# Patient Record
Sex: MALE | Race: WHITE | URBAN - METROPOLITAN AREA
[De-identification: names, ages, dates, MRNs, and addresses within clinical notes are randomized per-mention and may not be internally consistent; named-entity substitution may affect disease eponyms.]

---

## 2017-01-18 ENCOUNTER — HOSPITAL ENCOUNTER (OUTPATIENT)
Dept: LAB | Age: 57
Discharge: HOME | End: 2017-01-18

## 2017-01-18 PROCEDURE — 99001 SPECIMEN HANDLING PT-LAB: CPT | Performed by: INTERNAL MEDICINE

## 2017-01-25 ENCOUNTER — OFFICE VISIT (OUTPATIENT)
Dept: FAMILY MEDICINE CLINIC | Age: 57
End: 2017-01-25

## 2017-01-25 VITALS
RESPIRATION RATE: 18 BRPM | DIASTOLIC BLOOD PRESSURE: 79 MMHG | BODY MASS INDEX: 36.13 KG/M2 | OXYGEN SATURATION: 93 % | HEIGHT: 73 IN | TEMPERATURE: 97.9 F | WEIGHT: 272.6 LBS | SYSTOLIC BLOOD PRESSURE: 122 MMHG | HEART RATE: 91 BPM

## 2017-01-25 DIAGNOSIS — Z00.00 ROUTINE GENERAL MEDICAL EXAMINATION AT A HEALTH CARE FACILITY: ICD-10-CM

## 2017-01-25 DIAGNOSIS — R94.4 DECREASED GFR: ICD-10-CM

## 2017-01-25 DIAGNOSIS — E78.5 HYPERLIPIDEMIA, UNSPECIFIED HYPERLIPIDEMIA TYPE: Primary | ICD-10-CM

## 2017-01-25 DIAGNOSIS — M06.4 POLYARTHROPATHY, INFLAMMATORY (HCC): ICD-10-CM

## 2017-01-25 LAB
BILIRUB UR QL STRIP: NEGATIVE
GLUCOSE UR-MCNC: NEGATIVE MG/DL
KETONES P FAST UR STRIP-MCNC: NEGATIVE MG/DL
PH UR STRIP: 6 [PH] (ref 4.6–8)
PROT UR QL STRIP: NEGATIVE MG/DL
SP GR UR STRIP: 1.02 (ref 1–1.03)
UA UROBILINOGEN AMB POC: NORMAL (ref 0.2–1)
URINALYSIS CLARITY POC: CLEAR
URINALYSIS COLOR POC: YELLOW
URINE BLOOD POC: NORMAL
URINE LEUKOCYTES POC: NEGATIVE
URINE NITRITES POC: NEGATIVE

## 2017-01-25 NOTE — PROGRESS NOTES
Subjective:     Fabiana Marshall is a 60 y.o. male presenting for annual exam and complete physical.    Patient Active Problem List   Diagnosis Code    Lumbago M54.5    Other and unspecified hyperlipidemia E78.5     Patient Active Problem List    Diagnosis Date Noted    Other and unspecified hyperlipidemia 06/11/2014    Lumbago 01/04/2012     Current Outpatient Prescriptions   Medication Sig Dispense Refill    lovastatin (MEVACOR) 20 mg tablet Take 1 Tab by mouth nightly. 90 Tab 3    terbinafine HCl (LAMISIL) 250 mg tablet Take 1 Tab by mouth daily. 90 Tab 0    cyclobenzaprine (FLEXERIL) 10 mg tablet Take 1 Tab by mouth three (3) times daily as needed for Muscle Spasm(s). 15 Tab 0    tadalafil (CIALIS) 20 mg tablet Take 20 mg by mouth as needed.  4 Tab 12     No Known Allergies  Past Medical History   Diagnosis Date    Hypercholesterolemia      Past Surgical History   Procedure Laterality Date    Hx heent       T&A    Hx orthopaedic       knee x 2     Family History   Problem Relation Age of Onset    Cancer Father      prostate     Social History   Substance Use Topics    Smoking status: Never Smoker    Smokeless tobacco: Never Used    Alcohol use 3.5 oz/week     7 Cans of beer per week        Lab Results  Component Value Date/Time   WBC 6.2 01/18/2017 08:16 AM   HGB 14.7 01/18/2017 08:16 AM   HCT 42.6 01/18/2017 08:16 AM   PLATELET 788 31/07/3782 08:16 AM   MCV 86 01/18/2017 08:16 AM       Lab Results  Component Value Date/Time   Glucose 95 01/18/2017 08:16 AM   LDL, calculated 99 01/18/2017 08:16 AM   LDL Cholesterol 185 06/14/2013 08:40 AM   Creatinine 1.49 01/18/2017 08:16 AM      Lab Results  Component Value Date/Time   Cholesterol, total 178 01/18/2017 08:16 AM   Cholesterol, Total 276 06/14/2013 08:40 AM   HDL Cholesterol 40 01/18/2017 08:16 AM   HDL Cholesterol 42 06/14/2013 08:40 AM   LDL, calculated 99 01/18/2017 08:16 AM   LDL Cholesterol 185 06/14/2013 08:40 AM   Triglyceride 193 01/18/2017 08:16 AM       Lab Results  Component Value Date/Time   ALT 22 01/18/2017 08:16 AM   AST 24 01/18/2017 08:16 AM   Alk. phosphatase 73 01/18/2017 08:16 AM   Bilirubin, total 0.4 01/18/2017 08:16 AM       Lab Results  Component Value Date/Time   GFR est AA 59 01/18/2017 08:16 AM   GFR est non-AA 51 01/18/2017 08:16 AM   Creatinine 1.49 01/18/2017 08:16 AM   BUN 20 01/18/2017 08:16 AM   Sodium 143 01/18/2017 08:16 AM   Potassium 4.5 01/18/2017 08:16 AM   Chloride 103 01/18/2017 08:16 AM   CO2 24 01/18/2017 08:16 AM      Lab Results  Component Value Date/Time   Prostate Specific Ag 1.2 01/18/2017 08:16 AM   Prostate Specific Ag 2.8 12/21/2015 09:22 AM   Prostate Specific Ag 1.9 12/15/2014 09:13 AM     Lab Results  Component Value Date/Time   TSH 2.670 01/18/2017 08:16 AM   T4, Free 0.94 01/18/2017 08:16 AM      Lab Results   Component Value Date/Time    Glucose 95 01/18/2017 08:16 AM      Reviewed labs  gfr slightly down     Review of Systems  A comprehensive review of systems was negative.     Objective:     Visit Vitals    /79    Pulse 91    Temp 97.9 °F (36.6 °C)    Resp 18    Ht 6' 1\" (1.854 m)    Wt 272 lb 9.6 oz (123.7 kg)    SpO2 93%    BMI 35.97 kg/m2     Physical exam:   General appearance - alert, well appearing, and in no distress, oriented to person, place, and time, overweight and well hydrated  Mental status - alert, oriented to person, place, and time, normal mood, behavior, speech, dress, motor activity, and thought processes  Eyes - pupils equal and reactive, extraocular eye movements intact, sclera anicteric  Mouth - mucous membranes moist, pharynx normal without lesions and tongue normal  Neck - supple, no significant adenopathy, carotids upstroke normal bilaterally, no bruits  Lymphatics - no palpable lymphadenopathy, no hepatosplenomegaly  Chest - clear to auscultation, no wheezes, rales or rhonchi, symmetric air entry  Heart - normal rate, regular rhythm, normal S1, S2, no murmurs, rubs, clicks or gallops  Abdomen - soft, nontender, nondistended, no masses or organomegaly  bowel sounds normal  no bladder distension noted  no abdominal bruits  no pulsatile masses  no CVA tenderness  no inguinal adenopathy  no hernias noted   Male - no penile lesions or discharge, no testicular masses or tenderness, no hernias, PROSTATE EXAM: smooth and symmetric without nodules or tenderness, enlarged 2+, TESTICULAR EXAM: normal, no masses, PENIS: normal without lesions or discharge, circumcised, SCROTUM: normal, no masses  Rectal - negative without mass, lesions or tenderness  Back exam - full range of motion, no tenderness, palpable spasm or pain on motion, normal reflexes and strength bilateral lower extremities, sensory exam intact bilateral lower extremities  Neurological - alert, oriented, normal speech, no focal findings or movement disorder noted, motor and sensory grossly normal bilaterally, normal muscle tone, no tremors, strength 5/5  Musculoskeletal - no joint tenderness, deformity or swelling, no muscular tenderness noted, full range of motion without pain  Extremities - peripheral pulses normal, no pedal edema, no clubbing or cyanosis, no pedal edema noted, intact peripheral pulses  Skin - normal coloration and turgor, no rashes, no suspicious skin lesions noted   UA-  Assessment/Plan:     Routine exam  continue present plan, recheck BMP in 3 months. current treatment plan is effective, no change in therapy.

## 2017-01-25 NOTE — PROGRESS NOTES
Briscoe Babinski is a 60 y.o. male here today for annual physical.          1. Have you been to the ER, urgent care clinic since your last visit? Hospitalized since your last visit? No    2. Have you seen or consulted any other health care providers outside of the [facility] [place] since your last visit? Include any pap smears or colon screening.  No

## 2017-01-25 NOTE — MR AVS SNAPSHOT
Visit Information Date & Time Provider Department Dept. Phone Encounter #  
 1/25/2017 10:30 AM Cheli Marie, Brenda Mendes [phone] [id number] Follow-up Instructions Return in about 1 year (around 1/25/2018). Upcoming Health Maintenance Date Due COLONOSCOPY 4/18/1976 INFLUENZA AGE 9 TO ADULT 8/1/2016 DTaP/Tdap/Td series (2 - Td) 12/18/2023 Allergies as of 1/25/2017  Review Complete On: 1/25/2017 By: Cheli Marie MD  
 No Known Allergies Current Immunizations  Reviewed on 12/21/2015 Name Date Influenza Vaccine 12/21/2015  9:32 AM, 12/18/2013, 12/12/2012 Influenza Vaccine Daimiguelina Garciack) 12/17/2014 Tdap 12/18/2013 Not reviewed this visit You Were Diagnosed With   
  
 Codes Comments Hyperlipidemia, unspecified hyperlipidemia type    -  Primary ICD-10-CM: E78.5 ICD-9-CM: 272.4 Routine general medical examination at a health care facility     ICD-10-CM: Z00.00 ICD-9-CM: V70.0 Decreased GFR     ICD-10-CM: R94.4 ICD-9-CM: 794.4 Vitals BP Pulse Temp Resp Height(growth percentile) Weight(growth percentile) 122/79 91 97.9 °F (36.6 °C) 18 6' 1\" (1.854 m) 272 lb 9.6 oz (123.7 kg) SpO2 BMI Smoking Status 93% 35.97 kg/m2 Never Smoker Vitals History BMI and BSA Data Body Mass Index Body Surface Area  
 35.97 kg/m 2 2.52 m 2 Preferred Pharmacy Pharmacy Name Phone [id number] [address] [address] [phone] Your Updated Medication List  
  
   
This list is accurate as of: 1/25/17 11:03 AM.  Always use your most recent med list.  
  
  
  
  
 cyclobenzaprine 10 mg tablet Commonly known as:  FLEXERIL Take 1 Tab by mouth three (3) times daily as needed for Muscle Spasm(s). lovastatin 20 mg tablet Commonly known as:  MEVACOR Take 1 Tab by mouth nightly. tadalafil 20 mg tablet Commonly known as:  CIALIS Take 20 mg by mouth as needed. terbinafine HCl 250 mg tablet Commonly known as:  LAMISIL Take 1 Tab by mouth daily. We Performed the Following AMB POC URINALYSIS DIP STICK AUTO W/O MICRO [29253 CPT(R)] Follow-up Instructions Return in about 1 year (around 1/25/2018). To-Do List   
 01/25/2017 Lab:  METABOLIC PANEL, BASIC Introducing [facility]! Dear Rajeev Bruce: 
Thank you for requesting a [employer] account. Our records indicate that you already have an active EarlyDoc account. You can access your account anytime at [email or web address] Did you know that you can access your hospital and ER discharge instructions at any time in EarlyDoc? You can also review all of your test results from your hospital stay or ER visit. Additional Information If you have questions, please visit the Frequently Asked Questions section of the EarlyDoc website at [email or web address] Remember, EarlyDoc is NOT to be used for urgent needs. For medical emergencies, dial 911. Now available from your iPhone and Android! Please provide this summary of care documentation to your next provider. Your primary care clinician is listed as Charbel Posadas. If you have any questions after today's visit, please call [phone].

## 2017-07-20 ENCOUNTER — TELEPHONE (OUTPATIENT)
Dept: FAMILY MEDICINE CLINIC | Age: 57
End: 2017-07-20

## 2017-07-20 NOTE — TELEPHONE ENCOUNTER
Can you get more specifics: what joints, osteo vs rheumatoid, any x-rays? Find out if Dr. Estella Brunson takes their insurance.

## 2017-07-20 NOTE — TELEPHONE ENCOUNTER
Pt's wife called requesting a referral for him for arthritis. They do not have anybody in mind whatever DR Martina Mendoza recommends would be find. They have [employer], please advise.

## 2017-07-25 NOTE — TELEPHONE ENCOUNTER
Patient states its wrists, knuckles, knees, elbow. Patient states his arthritis has not been diagnosed by anyone so he doesn't know if its osteo or rheumatoid.   Still wants the referral.

## 2017-12-19 RX ORDER — LOVASTATIN 20 MG/1
TABLET ORAL
Qty: 90 TAB | Refills: 3 | Status: SHIPPED | OUTPATIENT
Start: 2017-12-19

## 2018-02-14 ENCOUNTER — TELEPHONE (OUTPATIENT)
Dept: FAMILY MEDICINE CLINIC | Age: 58
End: 2018-02-14

## 2018-02-14 DIAGNOSIS — Z00.00 ROUTINE GENERAL MEDICAL EXAMINATION AT A HEALTH CARE FACILITY: ICD-10-CM

## 2018-02-14 DIAGNOSIS — Z00.00 ROUTINE GENERAL MEDICAL EXAMINATION AT A HEALTH CARE FACILITY: Primary | ICD-10-CM

## 2018-02-19 LAB
A-G RATIO,AGRAT: 1.9 RATIO (ref 1.1–2.6)
ABSOLUTE LYMPHOCYTE COUNT, 10803: 2.1 K/UL (ref 1–4.8)
ALBUMIN SERPL-MCNC: 4.5 G/DL (ref 3.5–5)
ALP SERPL-CCNC: 84 U/L (ref 25–115)
ALT SERPL-CCNC: 26 U/L (ref 5–40)
ANION GAP SERPL CALC-SCNC: 16 MMOL/L
AST SERPL W P-5'-P-CCNC: 25 U/L (ref 10–37)
BASOPHILS # BLD: 0 K/UL (ref 0–0.2)
BASOPHILS NFR BLD: 1 % (ref 0–2)
BILIRUB SERPL-MCNC: 0.5 MG/DL (ref 0.2–1.2)
BUN SERPL-MCNC: 18 MG/DL (ref 6–22)
CALCIUM SERPL-MCNC: 9.4 MG/DL (ref 8.4–10.4)
CHLORIDE SERPL-SCNC: 103 MMOL/L (ref 98–110)
CHOLEST SERPL-MCNC: 171 MG/DL (ref 110–200)
CO2 SERPL-SCNC: 24 MMOL/L (ref 20–32)
CREAT SERPL-MCNC: 1.2 MG/DL (ref 0.5–1.2)
EOSINOPHIL # BLD: 0.2 K/UL (ref 0–0.5)
EOSINOPHIL NFR BLD: 4 % (ref 0–6)
ERYTHROCYTE [DISTWIDTH] IN BLOOD BY AUTOMATED COUNT: 14.4 % (ref 10–16)
GFRAA, 66117: >60
GFRNA, 66118: 59.7
GLOBULIN,GLOB: 2.4 G/DL (ref 2–4)
GLUCOSE SERPL-MCNC: 98 MG/DL (ref 70–99)
GRANULOCYTES,GRANS: 50 % (ref 40–75)
HCT VFR BLD AUTO: 43.4 % (ref 39.3–51.6)
HDLC SERPL-MCNC: 36 MG/DL (ref 40–59)
HDLC SERPL-MCNC: 4.8 MG/DL (ref 0–5)
HGB BLD-MCNC: 14.3 G/DL (ref 13.1–17.2)
LYMPHOCYTES, LYMLT: 35 % (ref 27–45)
MCH RBC QN AUTO: 29 PG (ref 26–34)
MCHC RBC AUTO-ENTMCNC: 33 G/DL (ref 32–36)
MCV RBC AUTO: 88 FL (ref 80–95)
MONOCYTES # BLD: 0.6 K/UL (ref 0.1–0.9)
MONOCYTES NFR BLD: 11 % (ref 3–9)
NEUTROPHILS # BLD AUTO: 3 K/UL (ref 1.8–7.7)
PLATELET # BLD AUTO: 272 K/UL (ref 140–440)
PMV BLD AUTO: 10.1 FL (ref 6–10.8)
POTASSIUM SERPL-SCNC: 4.6 MMOL/L (ref 3.5–5.5)
PROT SERPL-MCNC: 6.9 G/DL (ref 6.4–8.3)
PSA SERPL-MCNC: 0.98 NG/ML
RBC # BLD AUTO: 4.94 M/UL (ref 3.8–5.8)
SODIUM SERPL-SCNC: 143 MMOL/L (ref 133–145)
T4 FREE SERPL-MCNC: 1.2 NG/DL (ref 0.9–1.8)
TSH SERPL-ACNC: 2.74 MCU/ML (ref 0.27–4.2)
WBC # BLD AUTO: 5.9 K/UL (ref 4–11)

## 2018-02-28 ENCOUNTER — OFFICE VISIT (OUTPATIENT)
Dept: FAMILY MEDICINE CLINIC | Age: 58
End: 2018-02-28

## 2018-02-28 VITALS
DIASTOLIC BLOOD PRESSURE: 79 MMHG | OXYGEN SATURATION: 95 % | HEIGHT: 73 IN | RESPIRATION RATE: 18 BRPM | WEIGHT: 275.6 LBS | HEART RATE: 66 BPM | SYSTOLIC BLOOD PRESSURE: 126 MMHG | BODY MASS INDEX: 36.53 KG/M2 | TEMPERATURE: 98.1 F

## 2018-02-28 DIAGNOSIS — Z00.00 ROUTINE GENERAL MEDICAL EXAMINATION AT A HEALTH CARE FACILITY: Primary | ICD-10-CM

## 2018-02-28 DIAGNOSIS — Z00.00 ROUTINE GENERAL MEDICAL EXAMINATION AT A HEALTH CARE FACILITY: ICD-10-CM

## 2018-02-28 PROBLEM — M19.90 INFLAMMATORY ARTHROPATHY: Status: ACTIVE | Noted: 2018-02-28

## 2018-02-28 NOTE — MR AVS SNAPSHOT
[address] [facility] [address] [phone] Patient: Jake Hester MRN: [id number] PMQ:[birth year] Visit Information Date & Time Provider Department Dept. Phone Encounter #  
 2/28/2018  3:15 PM Deneen Davidson MD [employer] [phone] [id number] Follow-up Instructions Return in about 1 year (around 2/28/2019). Follow-up and Disposition History Upcoming Health Maintenance Date Due COLONOSCOPY 4/18/1976 Influenza Age 5 to Adult 8/1/2017 ZOSTER VACCINE AGE 60> 2/18/2018 DTaP/Tdap/Td series (2 - Td) 12/18/2023 Allergies as of 2/28/2018  Review Complete On: 2/28/2018 By: Deneen Davidson MD  
 No Known Allergies Current Immunizations  Reviewed on 12/21/2015 Name Date Influenza Vaccine 12/21/2015  9:32 AM, 12/18/2013, 12/12/2012 Influenza Vaccine Jeremi Barbara) 12/17/2014 Tdap 12/18/2013 Not reviewed this visit You Were Diagnosed With   
  
 Codes Comments Routine general medical examination at a health care facility    -  Primary ICD-10-CM: Z00.00 ICD-9-CM: V70.0 Vitals BP Pulse Temp Resp Height(growth percentile) Weight(growth percentile) 126/79 66 98.1 °F (36.7 °C) 18 6' 1\" (1.854 m) 275 lb 9.6 oz (125 kg) SpO2 BMI Smoking Status 95% 36.36 kg/m2 Never Smoker BMI and BSA Data Body Mass Index Body Surface Area  
 36.36 kg/m 2 2.54 m 2 Preferred Pharmacy Pharmacy Name Phone [id number] [address] [address] [phone] Your Updated Medication List  
  
   
This list is accurate as of 2/28/18  3:55 PM.  Always use your most recent med list.  
  
  
  
  
 lovastatin 20 mg tablet Commonly known as:  MEVACOR  
take 1 tablet by mouth NIGHTLY  
  
 terbinafine HCl 250 mg tablet Commonly known as:  LAMISIL Take 1 Tab by mouth daily. Follow-up Instructions Return in about 1 year (around 2/28/2019). To-Do List   
 02/28/2018 Lab:  TRIGLYCERIDE Patient Instructions Body Mass Index: Care Instructions Your Care Instructions Body mass index (BMI) can help you see if your weight is raising your risk for health problems. It uses a formula to compare how much you weigh with how tall you are. · A BMI lower than 18.5 is considered underweight. · A BMI between 18.5 and 24.9 is considered healthy. · A BMI between 25 and 29.9 is considered overweight. A BMI of 30 or higher is considered obese. If your BMI is in the normal range, it means that you have a lower risk for weight-related health problems. If your BMI is in the overweight or obese range, you may be at increased risk for weight-related health problems, such as high blood pressure, heart disease, stroke, arthritis or joint pain, and diabetes. If your BMI is in the underweight range, you may be at increased risk for health problems such as fatigue, lower protection (immunity) against illness, muscle loss, bone loss, hair loss, and hormone problems. BMI is just one measure of your risk for weight-related health problems. You may be at higher risk for health problems if you are not active, you eat an unhealthy diet, or you drink too much alcohol or use tobacco products. Follow-up care is a key part of your treatment and safety. Be sure to make and go to all appointments, and call your doctor if you are having problems. It's also a good idea to know your test results and keep a list of the medicines you take. How can you care for yourself at home? · Practice healthy eating habits. This includes eating plenty of fruits, vegetables, whole grains, lean protein, and low-fat dairy. · If your doctor recommends it, get more exercise. Walking is a good choice. Bit by bit, increase the amount you walk every day.  Try for at least 30 minutes on most days of the week. · Do not smoke. Smoking can increase your risk for health problems. If you need help quitting, talk to your doctor about stop-smoking programs and medicines. These can increase your chances of quitting for good. · Limit alcohol to 2 drinks a day for men and 1 drink a day for women. Too much alcohol can cause health problems. If you have a BMI higher than 25 · Your doctor may do other tests to check your risk for weight-related health problems. This may include measuring the distance around your waist. A waist measurement of more than 40 inches in men or 35 inches in women can increase the risk of weight-related health problems. · Talk with your doctor about steps you can take to stay healthy or improve your health. You may need to make lifestyle changes to lose weight and stay healthy, such as changing your diet and getting regular exercise. If you have a BMI lower than 18.5 · Your doctor may do other tests to check your risk for health problems. · Talk with your doctor about steps you can take to stay healthy or improve your health. You may need to make lifestyle changes to gain or maintain weight and stay healthy, such as getting more healthy foods in your diet and doing exercises to build muscle. Where can you learn more? Go to http://tyler-nadiya.info/. Enter S176 in the search box to learn more about \"Body Mass Index: Care Instructions. \" Current as of: October 13, 2016 Content Version: 11.4 © 3755-3985 Healthwise, [employer]orporated. Care instructions adapted under license by [employer] (which disclaims liability or warranty for this information). If you have questions about a medical condition or this instruction, always ask your healthcare professional. [place] [address] any warranty or liability for your use of this information. Introducing [facility]! Dear Jessica Monday: Thank you for requesting a [employer] account. Our records indicate that you already have an active [employer] account. You can access your account anytime at [email or web address] Did you know that you can access your hospital and ER discharge instructions at any time in [employer]? You can also review all of your test results from your hospital stay or ER visit. Additional Information If you have questions, please visit the Frequently Asked Questions section of the [employer] website at [email or web address] Remember, [employer] is NOT to be used for urgent needs. For medical emergencies, dial 911. Now available from your iPhone and Android! Please provide this summary of care documentation to your next provider. Your primary care clinician is listed as Nenita Zamora. If you have any questions after today's visit, please call [phone].

## 2018-02-28 NOTE — PROGRESS NOTES
Discussed the patient's BMI with him. The BMI follow up plan is as follows:     dietary management education, guidance, and counseling  encourage exercise  monitor weight  prescribed dietary intake    An After Visit Summary was printed and given to the patient. Subjective:     Amadou Angulo is a 59 y.o. male presenting for annual exam and complete physical.    Patient Active Problem List   Diagnosis Code    Lumbago M54.5    Other and unspecified hyperlipidemia E78.5    Inflammatory arthropathy M19.90     Patient Active Problem List    Diagnosis Date Noted    Inflammatory arthropathy 02/28/2018    Other and unspecified hyperlipidemia 06/11/2014    Lumbago 01/04/2012     Current Outpatient Prescriptions   Medication Sig Dispense Refill    lovastatin (MEVACOR) 20 mg tablet take 1 tablet by mouth NIGHTLY 90 Tab 3    terbinafine HCl (LAMISIL) 250 mg tablet Take 1 Tab by mouth daily.  90 Tab 0     No Known Allergies  Past Medical History:   Diagnosis Date    Dislocated shoulder 05/2017    right     Fracture of rib of right side 05/2017    Hypercholesterolemia      Past Surgical History:   Procedure Laterality Date    HX HEENT      T&A    HX ORTHOPAEDIC      knee x 2     Family History   Problem Relation Age of Onset    Cancer Father      prostate     Social History   Substance Use Topics    Smoking status: Never Smoker    Smokeless tobacco: Never Used    Alcohol use 3.5 oz/week     7 Cans of beer per week        Lab Results   Component Value Date/Time    WBC 5.9 02/19/2018 08:31 AM    HGB 14.3 02/19/2018 08:31 AM    HCT 43.4 02/19/2018 08:31 AM    PLATELET 271 17/70/3640 08:31 AM    MCV 88 02/19/2018 08:31 AM     Lab Results   Component Value Date/Time    Glucose 98 02/19/2018 08:31 AM    LDL, calculated 99 01/18/2017 08:16 AM    Creatinine 1.2 02/19/2018 08:31 AM      Lab Results   Component Value Date/Time    Cholesterol, total 171 02/19/2018 08:31 AM    HDL Cholesterol 36 (L) 02/19/2018 08:31 AM    LDL, calculated 99 01/18/2017 08:16 AM    Triglyceride 193 (H) 01/18/2017 08:16 AM     Lab Results   Component Value Date/Time    ALT (SGPT) 26 02/19/2018 08:31 AM    AST (SGOT) 25 02/19/2018 08:31 AM    Alk.  phosphatase 84 02/19/2018 08:31 AM    Bilirubin, total 0.5 02/19/2018 08:31 AM    Albumin 4.5 02/19/2018 08:31 AM    Protein, total 6.9 02/19/2018 08:31 AM    PLATELET 934 38/76/9387 08:31 AM       Lab Results   Component Value Date/Time    GFR est non-AA 51 (L) 01/18/2017 08:16 AM    GFR est AA 59 (L) 01/18/2017 08:16 AM    Creatinine 1.2 02/19/2018 08:31 AM    BUN 18 02/19/2018 08:31 AM    Sodium 143 02/19/2018 08:31 AM    Potassium 4.6 02/19/2018 08:31 AM    Chloride 103 02/19/2018 08:31 AM    CO2 24 02/19/2018 08:31 AM     Lab Results   Component Value Date/Time    Prostate Specific Ag 0.981 02/19/2018 08:31 AM    Prostate Specific Ag 1.2 01/18/2017 08:16 AM    Prostate Specific Ag 2.8 12/21/2015 09:22 AM     Lab Results   Component Value Date/Time    TSH 2.74 02/19/2018 08:31 AM    TSH 2.670 01/18/2017 08:16 AM    T4, Free 1.2 02/19/2018 08:31 AM      Lab Results   Component Value Date/Time    Glucose 98 02/19/2018 08:31 AM      Reviewed  No triglycerides done     Review of Systems  A comprehensive review of systems was negative except for: Musculoskeletal: positive for arthralgias    Objective:     Visit Vitals    /79    Pulse 66    Temp 98.1 °F (36.7 °C)    Resp 18    Ht 6' 1\" (1.854 m)    Wt 275 lb 9.6 oz (125 kg)    SpO2 95%    BMI 36.36 kg/m2     Physical exam:   General appearance - alert, well appearing, and in no distress, oriented to person, place, and time, overweight and well hydrated  Mental status - alert, oriented to person, place, and time, normal mood, behavior, speech, dress, motor activity, and thought processes  Eyes - pupils equal and reactive, extraocular eye movements intact, sclera anicteric  Mouth - mucous membranes moist, pharynx normal without lesions and tongue normal  Neck - supple, no significant adenopathy, carotids upstroke normal bilaterally, no bruits  Lymphatics - no palpable lymphadenopathy, no hepatosplenomegaly  Chest - clear to auscultation, no wheezes, rales or rhonchi, symmetric air entry  Heart - normal rate, regular rhythm, normal S1, S2, no murmurs, rubs, clicks or gallops  Abdomen - soft, nontender, nondistended, no masses or organomegaly  bowel sounds normal  no bladder distension noted  no abdominal bruits  no pulsatile masses  no CVA tenderness  no inguinal adenopathy  no hernias noted   Male - no penile lesions or discharge, no testicular masses or tenderness, no hernias, TESTICULAR EXAM: normal, no masses, PENIS: normal without lesions or discharge, circumcised, SCROTUM: normal, no masses  Rectal - deferred, not clinically indicated  Back exam - full range of motion, no tenderness, palpable spasm or pain on motion, normal reflexes and strength bilateral lower extremities, sensory exam intact bilateral lower extremities  Neurological - alert, oriented, normal speech, no focal findings or movement disorder noted, motor and sensory grossly normal bilaterally, normal muscle tone, no tremors, strength 5/5  Musculoskeletal - no joint tenderness, deformity or swelling, no muscular tenderness noted, full range of motion without pain  Extremities - peripheral pulses normal, no pedal edema, no clubbing or cyanosis, no pedal edema noted, intact peripheral pulses  Skin - normal coloration and turgor, no rashes, no suspicious skin lesions noted     Assessment/Plan:     Routine exam  continue present plan, check triglycerides. current treatment plan is effective, no change in therapy.

## 2018-02-28 NOTE — PATIENT INSTRUCTIONS
Body Mass Index: Care Instructions  Your Care Instructions    Body mass index (BMI) can help you see if your weight is raising your risk for health problems. It uses a formula to compare how much you weigh with how tall you are. · A BMI lower than 18.5 is considered underweight. · A BMI between 18.5 and 24.9 is considered healthy. · A BMI between 25 and 29.9 is considered overweight. A BMI of 30 or higher is considered obese. If your BMI is in the normal range, it means that you have a lower risk for weight-related health problems. If your BMI is in the overweight or obese range, you may be at increased risk for weight-related health problems, such as high blood pressure, heart disease, stroke, arthritis or joint pain, and diabetes. If your BMI is in the underweight range, you may be at increased risk for health problems such as fatigue, lower protection (immunity) against illness, muscle loss, bone loss, hair loss, and hormone problems. BMI is just one measure of your risk for weight-related health problems. You may be at higher risk for health problems if you are not active, you eat an unhealthy diet, or you drink too much alcohol or use tobacco products. Follow-up care is a key part of your treatment and safety. Be sure to make and go to all appointments, and call your doctor if you are having problems. It's also a good idea to know your test results and keep a list of the medicines you take. How can you care for yourself at home? · Practice healthy eating habits. This includes eating plenty of fruits, vegetables, whole grains, lean protein, and low-fat dairy. · If your doctor recommends it, get more exercise. Walking is a good choice. Bit by bit, increase the amount you walk every day. Try for at least 30 minutes on most days of the week. · Do not smoke. Smoking can increase your risk for health problems. If you need help quitting, talk to your doctor about stop-smoking programs and medicines. These can increase your chances of quitting for good. · Limit alcohol to 2 drinks a day for men and 1 drink a day for women. Too much alcohol can cause health problems. If you have a BMI higher than 25  · Your doctor may do other tests to check your risk for weight-related health problems. This may include measuring the distance around your waist. A waist measurement of more than 40 inches in men or 35 inches in women can increase the risk of weight-related health problems. · Talk with your doctor about steps you can take to stay healthy or improve your health. You may need to make lifestyle changes to lose weight and stay healthy, such as changing your diet and getting regular exercise. If you have a BMI lower than 18.5  · Your doctor may do other tests to check your risk for health problems. · Talk with your doctor about steps you can take to stay healthy or improve your health. You may need to make lifestyle changes to gain or maintain weight and stay healthy, such as getting more healthy foods in your diet and doing exercises to build muscle. Where can you learn more? Go to http://tyler-nadiya.info/. Enter S176 in the search box to learn more about \"Body Mass Index: Care Instructions. \"  Current as of: October 13, 2016  Content Version: 11.4  © 2789-7328 [employer]. Care instructions adapted under license by [employer] (which disclaims liability or warranty for this information). If you have questions about a medical condition or this instruction, always ask your healthcare professional. [address] any warranty or liability for your use of this information.

## 2018-02-28 NOTE — PROGRESS NOTES
Neno Padron is a 59 y.o. male (: 1960) presenting to address:    Chief Complaint   Patient presents with    Complete Physical       Vitals:    18 1514   BP: 126/79   Pulse: 66   Resp: 18   Temp: 98.1 °F (36.7 °C)   SpO2: 95%   Weight: 275 lb 9.6 oz (125 kg)   Height: 6' 1\" (1.854 m)   PainSc:   0 - No pain       Hearing/Vision:   No exam data present    Learning Assessment:     Learning Assessment 2016   PRIMARY LEARNER Patient   HIGHEST LEVEL OF EDUCATION - PRIMARY LEARNER  > 4 YEARS OF COLLEGE   BARRIERS PRIMARY LEARNER NONE   CO-LEARNER CAREGIVER No   PRIMARY LANGUAGE ENGLISH   LEARNER PREFERENCE PRIMARY DEMONSTRATION   ANSWERED BY self   RELATIONSHIP SELF     Depression Screening:     PHQ over the last two weeks 2018   Little interest or pleasure in doing things Not at all   Feeling down, depressed or hopeless Not at all   Total Score PHQ 2 0     Fall Risk Assessment:   No flowsheet data found. Abuse Screening:     Abuse Screening Questionnaire 2015   Do you ever feel afraid of your partner? N   Are you in a relationship with someone who physically or mentally threatens you? N   Is it safe for you to go home? Y     Coordination of Care Questionaire:   1. Have you been to the ER, urgent care clinic since your last visit? Hospitalized since your last visit? YES Urgent Care    2. Have you seen or consulted any other health care providers outside of the [address] [place] [address] since your last visit? Include any pap smears or colon screening. YES Rheumatology, Orthopedic    Advanced Directive:   1. Do you have an Advanced Directive? YES    2. Would you like information on Advanced Directives?  NO

## 2018-03-07 LAB — TRIGL SERPL-MCNC: 293 MG/DL (ref 40–149)

## 2018-03-15 ENCOUNTER — TELEPHONE (OUTPATIENT)
Dept: FAMILY MEDICINE CLINIC | Age: 58
End: 2018-03-15

## 2018-03-15 RX ORDER — OMEGA-3-ACID ETHYL ESTERS 1 G/1
2 CAPSULE, LIQUID FILLED ORAL 2 TIMES DAILY
Qty: 120 CAP | Refills: 3 | Status: SHIPPED | OUTPATIENT
Start: 2018-03-15

## 2018-03-15 NOTE — TELEPHONE ENCOUNTER
----- Message from Genevieve Richards sent at 3/15/2018  2:58 PM EDT -----  Spoke with patient and gave resulted note.  Patient stated ok to lovaza  Please escribe

## 2018-03-19 ENCOUNTER — TELEPHONE (OUTPATIENT)
Dept: FAMILY MEDICINE CLINIC | Age: 58
End: 2018-03-19

## 2018-03-26 NOTE — TELEPHONE ENCOUNTER
Call placed to [phone] to check on PA status. Spoke with Kingsley Gann in the pharmacy department and she states Lovaza PA was denied. She is going to fax over denial letter.

## 2018-06-15 PROBLEM — E66.01 SEVERE OBESITY (BMI 35.0-39.9): Status: ACTIVE | Noted: 2018-06-15

## 2019-01-15 RX ORDER — LOVASTATIN 20 MG/1
TABLET ORAL
Qty: 90 TAB | Refills: 3 | OUTPATIENT
Start: 2019-01-15